# Patient Record
Sex: MALE | Race: WHITE
[De-identification: names, ages, dates, MRNs, and addresses within clinical notes are randomized per-mention and may not be internally consistent; named-entity substitution may affect disease eponyms.]

---

## 2019-09-06 ENCOUNTER — HOSPITAL ENCOUNTER (EMERGENCY)
Dept: HOSPITAL 74 - JER | Age: 49
Discharge: HOME | End: 2019-09-06
Payer: COMMERCIAL

## 2019-09-06 VITALS — DIASTOLIC BLOOD PRESSURE: 110 MMHG | SYSTOLIC BLOOD PRESSURE: 176 MMHG | HEART RATE: 86 BPM

## 2019-09-06 VITALS — BODY MASS INDEX: 32.8 KG/M2

## 2019-09-06 VITALS — TEMPERATURE: 98.4 F

## 2019-09-06 DIAGNOSIS — I16.0: Primary | ICD-10-CM

## 2019-09-06 DIAGNOSIS — F17.210: ICD-10-CM

## 2019-09-06 LAB
ALBUMIN SERPL-MCNC: 3.9 G/DL (ref 3.4–5)
ALP SERPL-CCNC: 96 U/L (ref 45–117)
ALT SERPL-CCNC: 65 U/L (ref 13–61)
ANION GAP SERPL CALC-SCNC: 11 MMOL/L (ref 8–16)
AST SERPL-CCNC: 37 U/L (ref 15–37)
BASOPHILS # BLD: 0.7 % (ref 0–2)
BILIRUB SERPL-MCNC: 0.3 MG/DL (ref 0.2–1)
BUN SERPL-MCNC: 11.1 MG/DL (ref 7–18)
CALCIUM SERPL-MCNC: 9.4 MG/DL (ref 8.5–10.1)
CHLORIDE SERPL-SCNC: 104 MMOL/L (ref 98–107)
CO2 SERPL-SCNC: 28 MMOL/L (ref 21–32)
CREAT SERPL-MCNC: 1 MG/DL (ref 0.55–1.3)
DEPRECATED RDW RBC AUTO: 12.6 % (ref 11.9–15.9)
EOSINOPHIL # BLD: 0.8 % (ref 0–4.5)
GLUCOSE SERPL-MCNC: 126 MG/DL (ref 74–106)
HCT VFR BLD CALC: 44.9 % (ref 35.4–49)
HGB BLD-MCNC: 15.7 GM/DL (ref 11.7–16.9)
INR BLD: 0.99 (ref 0.83–1.09)
LYMPHOCYTES # BLD: 24.4 % (ref 8–40)
MAGNESIUM SERPL-MCNC: 2.1 MG/DL (ref 1.8–2.4)
MCH RBC QN AUTO: 33.4 PG (ref 25.7–33.7)
MCHC RBC AUTO-ENTMCNC: 35 G/DL (ref 32–35.9)
MCV RBC: 95.4 FL (ref 80–96)
MONOCYTES # BLD AUTO: 9.6 % (ref 3.8–10.2)
NEUTROPHILS # BLD: 64.5 % (ref 42.8–82.8)
PH UR: 6 [PH] (ref 5–8)
PLATELET # BLD AUTO: 225 K/MM3 (ref 134–434)
PMV BLD: 8.1 FL (ref 7.5–11.1)
POTASSIUM SERPLBLD-SCNC: 3.9 MMOL/L (ref 3.5–5.1)
PROT SERPL-MCNC: 6.6 G/DL (ref 6.4–8.2)
PT PNL PPP: 11.7 SEC (ref 9.7–13)
RBC # BLD AUTO: 4.71 M/MM3 (ref 4–5.6)
SODIUM SERPL-SCNC: 143 MMOL/L (ref 136–145)
SP GR UR: 1.02 (ref 1.01–1.03)
UROBILINOGEN UR STRIP-MCNC: 0.2 MG/DL (ref 0.2–1)
WBC # BLD AUTO: 8.7 K/MM3 (ref 4–10)

## 2019-09-06 NOTE — PDOC
History of Present Illness





- General


Chief Complaint: Blood Pressure Problem


Stated Complaint: HIGH BP


Time Seen by Provider: 09/06/19 14:42


History Source: Patient


Exam Limitations: No Limitations





- History of Present Illness


Initial Comments: 


Pt is a 49 yo M, with PMH of HTN (previously on metoprolol, stopped by his 

doctor 4 years ago), who is presenting with complaints of HTN, blurry vision, 

and head pressure. 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 

noticed his vision became blurry and he experienced mild head pressure, which 

has since resolved. His BP taken by coworkers was systolic 220s, and they 

noticed he was diaphoretic so they brought him to the ER. Pt endorses recent 

stress at home. Pt denies any fevers/chills, syncope, chest pain, palpitations, 

SOB, nausea/vomiting, abdominal pain, urinary symptoms, diarrhea/constipation, 

or leg swelling.





Allergies: NKDA


PCP: None





Social: Pt smokes 1/3 ppd and drinks coffee daily. Pt denies any alcohol, drug 

use, or stimulants/energy drinks.


Pt denies any recent travel or sick contacts.


Surgical: no relevant history.


Family: mother with "brain aneurysm" in 50s. MGM with fatal MI at 65. 


09/06/19 16:41











Past History





- Travel


Traveled outside of the country in the last 30 days: No


Close contact w/someone who was outside of country & ill: No





- Past Medical History


Allergies/Adverse Reactions: 


 Allergies











Allergy/AdvReac Type Severity Reaction Status Date / Time


 


No Known Allergies Allergy   Verified 09/06/19 13:41











Home Medications: 


Ambulatory Orders





Metoprolol Tartrate 25 mg PO DAILY #30 tablet 09/06/19 











- Suicide/Smoking/Psychosocial Hx


Smoking History: Never smoked


Information on smoking cessation initiated: No


Hx Alcohol Use: No


Drug/Substance Use Hx: No





Cardiac Specific PMH





- Complaint Specific PMHX


Abdominal Aortic Aneurysm: No


Angina: No


Cardiac Arrhythmia: No


Cardiac Stent: No


GERD: No


Myocardial Infarction: No


Pacemaker: No


Pulmonary Embolus: No


Valvular Heart Disease: No


Peripheral Vascular Disease: No





**Review of Systems





- Review of Systems


Able to Perform ROS?: Yes


Is the patient limited English proficient: No


Constitutional: Yes: Weight Stable.  No: Chills, Diaphoresis, Fever, Loss of 

Appetite, Malaise, Weakness


HEENTM: Yes: Blurred Vision.  No: Recent change in vision, Double Vision, Nose 

Congestion, Throat Pain, Throat Swelling, Difficulty Swallowing


Respiratory: No: Cough, Orthopnea, Shortness of Breath


Cardiac (ROS): No: Chest Pain, Edema, Irregular Heart Rate, Lightheadedness, 

Palpitations, Syncope, Chest Tightness


ABD/GI: No: Constipated, Diarrhea, Nausea, Poor Appetite, Poor Fluid Intake, 

Vomiting


: No: Burning, Dysuria, Frequency, Hematuria, Pain, Urgency


Musculoskeletal: No: Back Pain, Muscle Pain, Muscle Weakness


Integumentary: No: Rash


Neurological: Yes: Headache.  No: Numbness, Paresthesia, Tingling, Weakness, 

Unsteady Gait, Dizziness


Psychiatric: No: Sleep Pattern Change, Change in Appetite


Endocrine: No: Increased Urine, Change in Weight


Hematologic/Lymphatic: No: Anemia, Blood Clots, Easy Bleeding, Easy Bruising


All Other Systems: Reviewed and Negative





*Physical Exam





- Vital Signs


 Last Vital Signs











Temp Pulse Resp BP Pulse Ox


 


 98.4 F   96 H  16   153/100   96 


 


 09/06/19 13:39  09/06/19 13:39  09/06/19 13:39  09/06/19 14:26  09/06/19 13:39














- Physical Exam


Comments: 


BP improved to 150/100 on exam, pt afebrile. Pt in NAD, lying comfortably on 

exam; obese body habitus. 


Pt alert and oriented x3.


CNs generally intact, muscular strength and sensation intact. Cerebellar exam 

WNL.


No midline spinal tenderness, step-offs, or crepitus. 


Head normocephalic, atraumatic.


Eyes PERRLA, EOMI. Oropharynx without erythema or exudates, no LAD b/l. 


No nasal congestion, hearing intact. 


Clear heart sounds, S1/S2, no JVD, b/l pedal edema, or heart murmur. 


Clear lung sounds, no respiratory distress, wheezes, crackles, or accessory 

muscle use. 


No abdominal or CVA tenderness to palpation, no rebound, no guarding. Abdomen 

soft, non-distended, and with normoactive bowel sounds. 


Skin without jaundice or rash. 


09/06/19 16:46








ED Treatment Course





- LABORATORY


CBC & Chemistry Diagram: 


 09/06/19 15:01





 09/06/19 15:01





- ADDITIONAL ORDERS


Additional order review: 


 Laboratory  Results











  09/06/19 09/06/19 09/06/19





  15:28 15:01 15:01


 


PT with INR   11.70 


 


INR   0.99 


 


Sodium    143


 


Potassium    3.9


 


Chloride    104


 


Carbon Dioxide    28


 


Anion Gap    11


 


BUN    11.1


 


Creatinine    1.0


 


Est GFR (CKD-EPI)AfAm    101.98


 


Est GFR (CKD-EPI)NonAf    87.99


 


Random Glucose    126 H


 


Calcium    9.4


 


Magnesium    2.1


 


Total Bilirubin    0.3


 


AST    37


 


ALT    65 H


 


Alkaline Phosphatase    96


 


Creatine Kinase    144


 


Troponin I    < 0.02


 


Total Protein    6.6


 


Albumin    3.9


 


Urine Color  Yellow  


 


Urine Appearance  Clear  


 


Urine pH  6.0  


 


Ur Specific Gravity  1.025  


 


Urine Protein  Negative  


 


Urine Glucose (UA)  Negative  


 


Urine Ketones  Negative  


 


Urine Blood  Negative  


 


Urine Nitrite  Negative  


 


Urine Bilirubin  Negative  


 


Urine Urobilinogen  0.2  


 


Ur Leukocyte Esterase  Negative  








 











  09/06/19





  15:01


 


RBC  4.71


 


MCV  95.4


 


MCHC  35.0


 


RDW  12.6


 


MPV  8.1


 


Neutrophils %  64.5


 


Lymphocytes %  24.4


 


Monocytes %  9.6


 


Eosinophils %  0.8


 


Basophils %  0.7














- RADIOLOGY


Radiology Studies Ordered: 














 Category Date Time Status


 


 HEAD CT WITHOUT CONTRAST [CT] Stat CT Scan  09/06/19 17:05 Completed


 


 CHEST PA & LAT [RAD] Stat Radiology  09/06/19 14:43 Completed














Medical Decision Making





- Medical Decision Making


Pt was seen at bedside, also will be seen by attending Dr. Flannery. Pt 

presenting with HTN, head pressure, and blurry vision, likely 2/2 to HTN. Will 

evaluate with head CT and labs. Likely to d/c to home with PCP f/u and re-start 

his BP medication if work-up is normal.





Will continue to reassess pt and monitor for symptomatic improvement.





ECG: NSR, intervals WNL (HR 84, , QRS 88, QTc 430), LAD. T wave 

flattening aVL and V2. No prior ECG for comparison.





Pt has f/u appointment made Tuesday 9/10/2019 at 11:00 AM. 


09/06/19 16:42


09/06/19 16:48





Labs WNL


Chest x-ray and head CT with no acute pathology


Metoprolol sent to pt pharmacy. Strict return precautions provided with pt 

understanding. Work note provided.


09/06/19 17:44








*DC/Admit/Observation/Transfer


Diagnosis at time of Disposition: 


 Hypertensive emergency





Hypertension


Qualifiers:


 Hypertension type: unspecified Qualified Code(s): I10 - Essential (primary) 

hypertension








- Discharge Dispostion


Disposition: HOME


Condition at time of disposition: Improved


Decision to Admit order: No





- Prescriptions


Prescriptions: 


Metoprolol Tartrate 25 mg PO DAILY #30 tablet





- Referrals


Referrals: 


[facility] [place] [Provider Group]





- Patient Instructions


Printed Discharge Instructions:  DI for High Blood Pressure


Additional Instructions: 


You were seen in the ER today for high blood pressure. The results of your labs 

and imaging today were normal. Please follow-up with your primary care doctor 

within 1-2 days to discuss your visit and make sure your symptoms have 

improved. Please return to the ER if you have any worsening pain, development 

of fevers or chills, loss of consciousness, inability to tolerate food or fluids

, or any other concerns.





I have sent medications to your pharmacy. Please take these medications as 

prescribed. 





- Post Discharge Activity


Forms/Work/School Notes:  Back to Work

## 2019-09-06 NOTE — PDOC
Attending Attestation





- Resident


Resident Name: Nazanin Ramirez





- ED Attending Attestation


I have performed the following: I have examined & evaluated the patient, The 

case was reviewed & discussed with the resident, I agree w/resident's findings 

& plan





- HPI


HPI: 





09/06/19 16:28


49y/o M EMS with h/o HTN, treated with metoprolol in the past then taken off by 

his PCP, lost to follow-up since then now with known elevated blood pressures 

but has not sought medical treatment presents now for episode of blurry vision 

while placing an IV. The episode was initially painless, described as blurry 

vision with subsequent onset of mild gradual parietal headache, all of which 

resolved. There was never loss of vision or double vision, no nausea/vomiting/

chest pain/difficulty breathing/focal deficit/speech change.


Had cath in the past that was negative, no cardiopulmonary complaints. 


+ smoker








- Physicial Exam


PE: 





09/06/19 16:32


Blood pressure 190/116 at triage,.


Seated comfortably in stretcher, texting on his cell phone, no complaints


No acute distress and speaking full sentences


Pupils equal round reactive to light, extraocular movements are intact


Neck supple


Heart is regular, 2 out of 6 right upper sternal border systolic ejection murmur


Lungs are clear


Abdomen is benign


NEURO:


Mental status: The patient is alert and oriented x3.


Cranial nerves: Cranial nerves II through XII are intact


Motor: The upper extremities are 5 over 5 in all muscle groups. The lower 

extremities are 5 over 5 in all muscle groups. No pronator drift.


Sensation: Sensation is intact to light touch throughout.


Cerebellar: Finger-finger-nose is normal in both upper extremities. Heel-knee-

shin is normal in both lower extremities.


Reflexes: 2+ and symmetric in the upper and lower extremities.


Gait: Normal. Heel and toe walking are normal. Tandem gait is normal.





- Medical Decision Making





09/06/19 16:34


49y/o M known HTN and non-compliant with f/u and meds p/w brief episode of 

blurry vision, resolved. Hypertensive on arrival, now improved without meds but 

still elevated. no other sign of end organ injury, exam without red flags. 


labs sent and normal, ekg sinus with known old q waves per patient, cxr wnl. 


ct head pending


if above wnl, can d/c -- will arrange f/u with [facility] [place] [facility], restart previously 

prescribed metoprolol in the interim. Pt is a medical professional and 

understands return criteria. 





**Heart Score/ECG Review


  ** #1


ECG reviewed & interpreted by me at: 13:25


General ECG Interpretation: Sinus Rhythm, Normal Rate (84), Normal Intervals (

qtc 430), No acute ischemic changes (q waves III/AVF/V3)

## 2019-09-08 NOTE — EKG
Test Reason : 

Blood Pressure : ***/*** mmHG

Vent. Rate : 084 BPM     Atrial Rate : 084 BPM

   P-R Int : 156 ms          QRS Dur : 088 ms

    QT Int : 364 ms       P-R-T Axes : 008 -47 061 degrees

   QTc Int : 430 ms

 

NORMAL SINUS RHYTHM

LEFT ANTERIOR FASCICULAR BLOCK

INFERIOR INFARCT , AGE UNDETERMINED

ANTERIOR INFARCT , AGE UNDETERMINED

ABNORMAL ECG

NO PREVIOUS ECGS AVAILABLE

Confirmed by EMILY RÍOS MD ([id number]) on 9/8/2019 5:10:18 PM

 

Referred By:             Confirmed By:EMILY RÍOS MD